# Patient Record
Sex: FEMALE | Race: ASIAN | NOT HISPANIC OR LATINO | Employment: FULL TIME | URBAN - METROPOLITAN AREA
[De-identification: names, ages, dates, MRNs, and addresses within clinical notes are randomized per-mention and may not be internally consistent; named-entity substitution may affect disease eponyms.]

---

## 2019-03-25 ENCOUNTER — OFFICE VISIT (OUTPATIENT)
Dept: URGENT CARE | Facility: CLINIC | Age: 24
End: 2019-03-25
Payer: COMMERCIAL

## 2019-03-25 VITALS
SYSTOLIC BLOOD PRESSURE: 134 MMHG | RESPIRATION RATE: 16 BRPM | TEMPERATURE: 98.7 F | HEIGHT: 63 IN | DIASTOLIC BLOOD PRESSURE: 82 MMHG | BODY MASS INDEX: 18.07 KG/M2 | HEART RATE: 83 BPM | OXYGEN SATURATION: 98 % | WEIGHT: 102 LBS

## 2019-03-25 DIAGNOSIS — R21 RASH AND NONSPECIFIC SKIN ERUPTION: ICD-10-CM

## 2019-03-25 PROCEDURE — 99203 OFFICE O/P NEW LOW 30 MIN: CPT | Performed by: PHYSICIAN ASSISTANT

## 2019-03-25 RX ORDER — METHYLPREDNISOLONE SODIUM SUCCINATE 125 MG/2ML
125 INJECTION, POWDER, LYOPHILIZED, FOR SOLUTION INTRAMUSCULAR; INTRAVENOUS ONCE
Status: COMPLETED | OUTPATIENT
Start: 2019-03-25 | End: 2019-03-25

## 2019-03-25 RX ORDER — PREDNISONE 10 MG/1
TABLET ORAL
Qty: 20 TAB | Refills: 0 | Status: SHIPPED | OUTPATIENT
Start: 2019-03-25

## 2019-03-25 RX ORDER — TRIAMCINOLONE ACETONIDE 1 MG/G
CREAM TOPICAL
Qty: 1 TUBE | Refills: 1 | Status: SHIPPED | OUTPATIENT
Start: 2019-03-25

## 2019-03-25 RX ADMIN — METHYLPREDNISOLONE SODIUM SUCCINATE 125 MG: 125 INJECTION, POWDER, LYOPHILIZED, FOR SOLUTION INTRAMUSCULAR; INTRAVENOUS at 19:34

## 2019-03-25 ASSESSMENT — ENCOUNTER SYMPTOMS
FEVER: 0
COUGH: 0
ABDOMINAL PAIN: 0
CHILLS: 0
VOMITING: 0
STRIDOR: 0
NAUSEA: 0
SHORTNESS OF BREATH: 0

## 2019-03-26 NOTE — PROGRESS NOTES
Subjective:     Larissa Osborne is a 23 y.o. female who presents for Rash (rashes both arms, face, neck, ears, both lower legs , itchy, burning x1-2 weeks, she has specialist appt)       Patient notes new onset rash over the last 2 weeks.  She states rash is itchy and burning has been present over arms face neck ears and bilateral lower legs.  She has an appointment with dermatology in 5wks. she notes last 2 weeks of itchy rash first began on ears.  Has diffuse distribution to torso arms and legs.  She states rash has waxed and waned each day.  She gets some resolution and waning through the day and then worsening through the night as she lies in bed.  She denies focal worsening lesions rather more diffuse spread of rash.  She does get diffuse distribution of slightly raised slightly erythematous papules she is able to demonstrate with a photograph.  She denies past medical history of similar.  We spent a lengthy time discussing potential triggers and find no clear concerns.  She denies any new products in the home new medications or recent travel.  She is tried hydrocortisone cream with moderate improved symptoms.  She denies any respiratory symptoms associated denies stridor cough or wheeze.  She denies past medical history of known similar allergies, hives/urticaria or anaphylaxis.      Rash   This is a new problem. The current episode started 1 to 4 weeks ago. Pertinent negatives include no cough, fever, shortness of breath or vomiting.   History reviewed. No pertinent past medical history.History reviewed. No pertinent surgical history.  Social History     Social History   • Marital status: Single     Spouse name: N/A   • Number of children: N/A   • Years of education: N/A     Occupational History   • Not on file.     Social History Main Topics   • Smoking status: Never Smoker   • Smokeless tobacco: Never Used   • Alcohol use Yes      Comment: socially   • Drug use: No   • Sexual activity: Not on file  "    Other Topics Concern   • Not on file     Social History Narrative   • No narrative on file    History reviewed. No pertinent family history. Review of Systems   Constitutional: Negative for chills and fever.   Respiratory: Negative for cough, shortness of breath and stridor.    Gastrointestinal: Negative for abdominal pain, nausea and vomiting.   Skin: Positive for itching and rash.   No Known Allergies   Objective:   /82   Pulse 83   Temp 37.1 °C (98.7 °F)   Resp 16   Ht 1.6 m (5' 3\")   Wt 46.3 kg (102 lb)   SpO2 98%   BMI 18.07 kg/m²   Physical Exam   Constitutional: She is oriented to person, place, and time. She appears well-developed and well-nourished. No distress.   HENT:   Head: Normocephalic and atraumatic.   Right Ear: External ear normal.   Left Ear: External ear normal.   Nose: Nose normal.   Eyes: Conjunctivae and lids are normal. Right eye exhibits no discharge. Left eye exhibits no discharge. No scleral icterus.   Neck: Neck supple.   Pulmonary/Chest: Effort normal. No respiratory distress.   Musculoskeletal: Normal range of motion.   Neurological: She is alert and oriented to person, place, and time. She is not disoriented.   Skin: Skin is warm and dry. Rash noted. No purpura noted. Rash is urticarial. Rash is not macular, not pustular and not vesicular. She is not diaphoretic. There is erythema. No pallor.   Minimal visible rash at time of evaluation today, patient does have photographs demonstrating diffuse distribution of slightly raised slightly erythematous papules, photograph appears nonvesicular nonpustular nonulcerative, skin intact     Psychiatric: Her speech is normal and behavior is normal.   Nursing note and vitals reviewed.  Solu-Medrol 125 IM-tolerates well      Assessment/Plan:   Assessment    1. Rash and nonspecific skin eruption  - predniSONE (DELTASONE) 10 MG Tab; Take 1 tab three times a day for 3 days, then 1 tab twice a day for 3 days, then 1 tab once daily for 3 " days. Take with food.  Dispense: 20 Tab; Refill: 0  - methylPREDNISolone sod succ (SOLU-MEDROL) 125 MG injection 125 mg; 2 mL by Intramuscular route Once.  - triamcinolone acetonide (KENALOG) 0.1 % Cream; Apply to affected area BID; limit application to once a day to face axilla and groin  Dispense: 1 Tube; Refill: 1  Supportive care is reviewed with patient/caregiver - recommend to push PO fluids and electrolytes, patient and significant other encouraged to continue to investigate potential triggers follow-up with dermatology - cautioned regarding potential side effects of steroid, avoid nsaids while using    Differential diagnosis, natural history, supportive care, and indications for immediate follow-up discussed.